# Patient Record
Sex: FEMALE | Race: BLACK OR AFRICAN AMERICAN | NOT HISPANIC OR LATINO | Employment: OTHER | URBAN - METROPOLITAN AREA
[De-identification: names, ages, dates, MRNs, and addresses within clinical notes are randomized per-mention and may not be internally consistent; named-entity substitution may affect disease eponyms.]

---

## 2017-02-09 ENCOUNTER — APPOINTMENT (OUTPATIENT)
Dept: CT IMAGING | Facility: HOSPITAL | Age: 57
End: 2017-02-09

## 2017-02-09 ENCOUNTER — HOSPITAL ENCOUNTER (EMERGENCY)
Facility: HOSPITAL | Age: 57
Discharge: HOME | End: 2017-02-09
Attending: EMERGENCY MEDICINE | Admitting: EMERGENCY MEDICINE

## 2017-02-09 VITALS
RESPIRATION RATE: 16 BRPM | HEART RATE: 70 BPM | SYSTOLIC BLOOD PRESSURE: 140 MMHG | TEMPERATURE: 97.9 F | WEIGHT: 187 LBS | DIASTOLIC BLOOD PRESSURE: 70 MMHG | OXYGEN SATURATION: 96 % | HEIGHT: 67 IN | BODY MASS INDEX: 29.35 KG/M2

## 2017-02-09 DIAGNOSIS — R10.30 LOWER ABDOMINAL PAIN: Primary | ICD-10-CM

## 2017-02-09 DIAGNOSIS — R19.7 DIARRHEA, UNSPECIFIED TYPE: ICD-10-CM

## 2017-02-09 LAB
ALBUMIN SERPL-MCNC: 4.7 G/DL (ref 3.5–5.2)
ALBUMIN/GLOB SERPL: 1.5 G/DL
ALP SERPL-CCNC: 93 U/L (ref 39–117)
ALT SERPL W P-5'-P-CCNC: 16 U/L (ref 1–33)
ANION GAP SERPL CALCULATED.3IONS-SCNC: 10.7 MMOL/L
AST SERPL-CCNC: 20 U/L (ref 1–32)
BASOPHILS # BLD AUTO: 0.02 10*3/MM3 (ref 0–0.2)
BASOPHILS NFR BLD AUTO: 0.3 % (ref 0–1.5)
BILIRUB SERPL-MCNC: 0.4 MG/DL (ref 0.1–1.2)
BILIRUB UR QL STRIP: NEGATIVE
BUN BLD-MCNC: 7 MG/DL (ref 6–20)
BUN/CREAT SERPL: 9.1 (ref 7–25)
CALCIUM SPEC-SCNC: 9.7 MG/DL (ref 8.6–10.5)
CHLORIDE SERPL-SCNC: 104 MMOL/L (ref 98–107)
CLARITY UR: CLEAR
CO2 SERPL-SCNC: 29.3 MMOL/L (ref 22–29)
COLOR UR: YELLOW
CREAT BLD-MCNC: 0.77 MG/DL (ref 0.57–1)
DEPRECATED RDW RBC AUTO: 39.2 FL (ref 37–54)
EOSINOPHIL # BLD AUTO: 0.14 10*3/MM3 (ref 0–0.7)
EOSINOPHIL NFR BLD AUTO: 1.8 % (ref 0.3–6.2)
ERYTHROCYTE [DISTWIDTH] IN BLOOD BY AUTOMATED COUNT: 12.8 % (ref 11.7–13)
GFR SERPL CREATININE-BSD FRML MDRD: 93 ML/MIN/1.73
GLOBULIN UR ELPH-MCNC: 3.1 GM/DL
GLUCOSE BLD-MCNC: 104 MG/DL (ref 65–99)
GLUCOSE UR STRIP-MCNC: NEGATIVE MG/DL
HCT VFR BLD AUTO: 41.8 % (ref 35.6–45.5)
HGB BLD-MCNC: 14.5 G/DL (ref 11.9–15.5)
HGB UR QL STRIP.AUTO: NEGATIVE
HOLD SPECIMEN: NORMAL
HOLD SPECIMEN: NORMAL
IMM GRANULOCYTES # BLD: 0.02 10*3/MM3 (ref 0–0.03)
IMM GRANULOCYTES NFR BLD: 0.3 % (ref 0–0.5)
KETONES UR QL STRIP: NEGATIVE
LEUKOCYTE ESTERASE UR QL STRIP.AUTO: NEGATIVE
LIPASE SERPL-CCNC: 15 U/L (ref 13–60)
LYMPHOCYTES # BLD AUTO: 1.98 10*3/MM3 (ref 0.9–4.8)
LYMPHOCYTES NFR BLD AUTO: 25.3 % (ref 19.6–45.3)
MCH RBC QN AUTO: 29.3 PG (ref 26.9–32)
MCHC RBC AUTO-ENTMCNC: 34.7 G/DL (ref 32.4–36.3)
MCV RBC AUTO: 84.4 FL (ref 80.5–98.2)
MONOCYTES # BLD AUTO: 0.27 10*3/MM3 (ref 0.2–1.2)
MONOCYTES NFR BLD AUTO: 3.5 % (ref 5–12)
NEUTROPHILS # BLD AUTO: 5.39 10*3/MM3 (ref 1.9–8.1)
NEUTROPHILS NFR BLD AUTO: 68.8 % (ref 42.7–76)
NITRITE UR QL STRIP: NEGATIVE
PH UR STRIP.AUTO: <=5 [PH] (ref 5–8)
PLATELET # BLD AUTO: 218 10*3/MM3 (ref 140–500)
PMV BLD AUTO: 10.7 FL (ref 6–12)
POTASSIUM BLD-SCNC: 3.8 MMOL/L (ref 3.5–5.2)
PROT SERPL-MCNC: 7.8 G/DL (ref 6–8.5)
PROT UR QL STRIP: NEGATIVE
RBC # BLD AUTO: 4.95 10*6/MM3 (ref 3.9–5.2)
SODIUM BLD-SCNC: 144 MMOL/L (ref 136–145)
SP GR UR STRIP: 1.01 (ref 1–1.03)
UROBILINOGEN UR QL STRIP: NORMAL
WBC NRBC COR # BLD: 7.82 10*3/MM3 (ref 4.5–10.7)
WHOLE BLOOD HOLD SPECIMEN: NORMAL
WHOLE BLOOD HOLD SPECIMEN: NORMAL

## 2017-02-09 PROCEDURE — 81003 URINALYSIS AUTO W/O SCOPE: CPT | Performed by: EMERGENCY MEDICINE

## 2017-02-09 PROCEDURE — 83690 ASSAY OF LIPASE: CPT | Performed by: EMERGENCY MEDICINE

## 2017-02-09 PROCEDURE — 0 IOPAMIDOL 61 % SOLUTION: Performed by: EMERGENCY MEDICINE

## 2017-02-09 PROCEDURE — 99283 EMERGENCY DEPT VISIT LOW MDM: CPT

## 2017-02-09 PROCEDURE — 74177 CT ABD & PELVIS W/CONTRAST: CPT

## 2017-02-09 PROCEDURE — 80053 COMPREHEN METABOLIC PANEL: CPT | Performed by: EMERGENCY MEDICINE

## 2017-02-09 PROCEDURE — 85025 COMPLETE CBC W/AUTO DIFF WBC: CPT | Performed by: EMERGENCY MEDICINE

## 2017-02-09 RX ORDER — SODIUM CHLORIDE 0.9 % (FLUSH) 0.9 %
10 SYRINGE (ML) INJECTION AS NEEDED
Status: DISCONTINUED | OUTPATIENT
Start: 2017-02-09 | End: 2017-02-09 | Stop reason: HOSPADM

## 2017-02-09 RX ORDER — LATANOPROST 50 UG/ML
1 SOLUTION/ DROPS OPHTHALMIC NIGHTLY
COMMUNITY

## 2017-02-09 RX ORDER — OMEPRAZOLE 20 MG/1
20 CAPSULE, DELAYED RELEASE ORAL DAILY
COMMUNITY
Start: 2017-02-02 | End: 2017-03-04

## 2017-02-09 RX ORDER — TOPIRAMATE 100 MG/1
100 TABLET, FILM COATED ORAL NIGHTLY
COMMUNITY
Start: 2016-08-23 | End: 2017-05-08

## 2017-02-09 RX ORDER — CIPROFLOXACIN 500 MG/1
500 TABLET, FILM COATED ORAL 2 TIMES DAILY
COMMUNITY
End: 2017-05-08

## 2017-02-09 RX ORDER — POLYETHYLENE GLYCOL 3350 17 G/17G
17 POWDER, FOR SOLUTION ORAL DAILY
COMMUNITY

## 2017-02-09 RX ORDER — DICYCLOMINE HYDROCHLORIDE 10 MG/1
10 CAPSULE ORAL 3 TIMES DAILY PRN
Qty: 20 CAPSULE | Refills: 0 | Status: SHIPPED | OUTPATIENT
Start: 2017-02-09 | End: 2017-05-08

## 2017-02-09 RX ADMIN — IOPAMIDOL 85 ML: 612 INJECTION, SOLUTION INTRAVENOUS at 09:55

## 2017-02-09 NOTE — ED PROVIDER NOTES
EMERGENCY DEPARTMENT ENCOUNTER    CHIEF COMPLAINT  Chief Complaint: abd pain  History given by: patient  History limited by: nothing  Room Number: 27/27  PMD: Dyan Diggs MD      HPI:  Pt is a 56 y.o. female who presents complaining of lower abd pain onset 3 weeks ago. Pt also c/o diarrhea but denies black or bloody stools, dysuria, fever, chills, cough. Pt has seen her PCP but states no tests were done and she was sent home w/ Cipro. She also states she discontinued the Cipro because it caused her to have seizures again, but has not told her PCP that she discontinued the Cipro. She has previously seen a GI doctor who told her to take a laxative that she states has caused abd pain as well. Pt has hx of seizures, constipation.    Duration:  3 weeks  Onset: gradual  Timing: constant  Location: lower abd  Radiation: none  Quality: not described  Intensity/Severity: moderate  Progression: unchanged  Associated Symptoms: diarrhea (daily)  Aggravating Factors: none  Alleviating Factors: none  Previous Episodes: none  Treatment before arrival: laxative, Cipro (discontinued by patient)    PAST MEDICAL HISTORY  Active Ambulatory Problems     Diagnosis Date Noted   • No Active Ambulatory Problems     Resolved Ambulatory Problems     Diagnosis Date Noted   • No Resolved Ambulatory Problems     Past Medical History   Diagnosis Date   • Hypertension    • Seizures        PAST SURGICAL HISTORY  Past Surgical History   Procedure Laterality Date   • Hysterectomy         FAMILY HISTORY  Family History   Problem Relation Age of Onset   • Heart disease Sister    • Diabetes Paternal Aunt        SOCIAL HISTORY  Social History     Social History   • Marital status: Single     Spouse name: N/A   • Number of children: N/A   • Years of education: N/A     Occupational History   • Not on file.     Social History Main Topics   • Smoking status: Light Tobacco Smoker     Packs/day: 0.25     Types: Cigarettes   • Smokeless tobacco: Not  on file   • Alcohol use No   • Drug use: No   • Sexual activity: Not on file     Other Topics Concern   • Not on file     Social History Narrative   • No narrative on file       ALLERGIES  Latex and Penicillin g benzathine    REVIEW OF SYSTEMS  Review of Systems   Constitutional: Negative for chills and fever.   HENT: Negative for congestion and sore throat.    Eyes: Negative.    Respiratory: Negative for cough and shortness of breath.    Cardiovascular: Negative for chest pain and leg swelling.   Gastrointestinal: Positive for abdominal pain (lower) and diarrhea (daily). Negative for vomiting.   Genitourinary: Negative for difficulty urinating and dysuria.   Musculoskeletal: Negative for back pain and neck pain.   Skin: Negative for rash and wound.   Allergic/Immunologic: Negative.    Neurological: Negative for dizziness, weakness, numbness and headaches.   Psychiatric/Behavioral: Negative.    All other systems reviewed and are negative.      PHYSICAL EXAM  ED Triage Vitals   Temp Heart Rate Resp BP SpO2   02/09/17 0802 02/09/17 0802 02/09/17 0802 02/09/17 0802 02/09/17 0802   97.9 °F (36.6 °C) 85 16 166/89 98 %      Temp src Heart Rate Source Patient Position BP Location FiO2 (%)   -- 02/09/17 0859 02/09/17 0802 02/09/17 0802 --    Monitor Sitting Right arm        Physical Exam   Constitutional: She is oriented to person, place, and time and well-developed, well-nourished, and in no distress. No distress.   HENT:   Head: Normocephalic and atraumatic.   Eyes: EOM are normal. Pupils are equal, round, and reactive to light.   Neck: Normal range of motion. Neck supple.   Cardiovascular: Normal rate, regular rhythm and normal heart sounds.    Pulmonary/Chest: Effort normal and breath sounds normal. No respiratory distress.   Abdominal: Soft. She exhibits no mass. There is tenderness (LLQ). There is no rebound and no guarding.   Musculoskeletal: Normal range of motion. She exhibits no edema.   Neurological: She is alert  and oriented to person, place, and time. She has normal sensation and normal strength.   Skin: Skin is warm and dry. No rash noted.   Psychiatric: Mood and affect normal.   Nursing note and vitals reviewed.      LAB RESULTS  Lab Results (last 24 hours)     Procedure Component Value Units Date/Time    CBC & Differential [[id number]] Collected:  02/09/17 0843    Specimen:  Blood Updated:  02/09/17 0902    Narrative:       The following orders were created for panel order CBC & Differential.  Procedure                               Abnormality         Status                     ---------                               -----------         ------                     CBC Auto Differential[[id number]]         Abnormal            Final result                 Please view results for these tests on the individual orders.    Comprehensive Metabolic Panel [[id number]]  (Abnormal) Collected:  02/09/17 0843    Specimen:  Blood Updated:  02/09/17 0923     Glucose 104 (H) mg/dL      BUN 7 mg/dL      Creatinine 0.77 mg/dL      Sodium 144 mmol/L      Potassium 3.8 mmol/L      Chloride 104 mmol/L      CO2 29.3 (H) mmol/L      Calcium 9.7 mg/dL      Total Protein 7.8 g/dL      Albumin 4.70 g/dL      ALT (SGPT) 16 U/L      AST (SGOT) 20 U/L      Alkaline Phosphatase 93 U/L      Total Bilirubin 0.4 mg/dL      eGFR  African Amer 93 mL/min/1.73      Globulin 3.1 gm/dL      A/G Ratio 1.5 g/dL      BUN/Creatinine Ratio 9.1      Anion Gap 10.7 mmol/L     Lipase [[id number]]  (Normal) Collected:  02/09/17 0843    Specimen:  Blood Updated:  02/09/17 0923     Lipase 15 U/L     CBC Auto Differential [[id number]]  (Abnormal) Collected:  02/09/17 0843    Specimen:  Blood Updated:  02/09/17 0902     WBC 7.82 10*3/mm3      RBC 4.95 10*6/mm3      Hemoglobin 14.5 g/dL      Hematocrit 41.8 %      MCV 84.4 fL      MCH 29.3 pg      MCHC 34.7 g/dL      RDW 12.8 %      RDW-SD 39.2 fl      MPV 10.7 fL      Platelets 218 10*3/mm3      Neutrophil % 68.8 %      Lymphocyte  % 25.3 %      Monocyte % 3.5 (L) %      Eosinophil % 1.8 %      Basophil % 0.3 %      Immature Grans % 0.3 %      Neutrophils, Absolute 5.39 10*3/mm3      Lymphocytes, Absolute 1.98 10*3/mm3      Monocytes, Absolute 0.27 10*3/mm3      Eosinophils, Absolute 0.14 10*3/mm3      Basophils, Absolute 0.02 10*3/mm3      Immature Grans, Absolute 0.02 10*3/mm3     Urinalysis With / Culture If Indicated [[id number]]  (Normal) Collected:  02/09/17 0846    Specimen:  Urine from Urine, Clean Catch Updated:  02/09/17 0903     Color, UA Yellow      Appearance, UA Clear      pH, UA <=5.0      Specific Gravity, UA 1.008      Glucose, UA Negative      Ketones, UA Negative      Bilirubin, UA Negative      Blood, UA Negative      Protein, UA Negative      Leuk Esterase, UA Negative      Nitrite, UA Negative      Urobilinogen, UA 0.2 E.U./dL     Narrative:       Urine microscopic not indicated.        I ordered the above labs and reviewed the results    RADIOLOGY  CT Abdomen Pelvis With Contrast    (Results Pending)     I ordered the above noted radiological studies. Interpreted by radiologist. Discussed with radiologist (Dr. Velasco). Reviewed by me in PACS.       PROCEDURES  Procedures      PROGRESS AND CONSULTS  ED Course     0937  Ordered CT abd/pel for further evaluation. Placed call to PCP for consult.    0943  Call returned from Dr. Diggs, PCP, who states pt has c/o these sx for 3 years. Per PCP pt's GI doctor is Dr. Vinicio Lucero who pt last saw in March or April 2016.    1048  Rechecked pt who is resting comfortably. D/w pt normal CT abd/pel and unremarkable lab work and plan to d/c w/ f/u w/ Dr. Lucero. Gave RTER warnings. Pt understands and agrees with the plan. All questions answered.    MEDICAL DECISION MAKING  Results were reviewed/discussed with the patient and they were also made aware of online access. Pt also made aware that some labs, such as cultures, will not be resulted during ER visit and follow up with PMD is necessary.      MDM  Number of Diagnoses or Management Options     Amount and/or Complexity of Data Reviewed  Clinical lab tests: reviewed and ordered (Lab work is unremarkable.)  Tests in the radiology section of CPT®: ordered and reviewed (CT abd/pel shows nothing acute.)  Decide to obtain previous medical records or to obtain history from someone other than the patient: yes  Review and summarize past medical records: yes  Discuss the patient with other providers: yes (Dr. Diggs, PCP, Dr. Velasco, radiology)  Independent visualization of images, tracings, or specimens: yes    Patient Progress  Patient progress: stable         DIAGNOSIS  Final diagnoses:   Lower abdominal pain   Diarrhea, unspecified type       DISPOSITION  DISCHARGE    Patient discharged in stable condition.    Reviewed implications of results, diagnosis, meds, responsibility to follow up, warning signs and symptoms of possible worsening, potential complications and reasons to return to ER, including worsening abd pain.    Patient/Family voiced understanding of above instructions.    Discussed plan for discharge, as there is no emergent indication for admission.  Pt/family is agreeable and understands need for follow up and repeat testing.  Pt is aware that discharge does not mean that nothing is wrong but it indicates no emergency is present that requires admission and they must continue care with follow-up as given below or physician of their choice.     FOLLOW-UP  Vinicio Lucero MD  [address]  [place] [address]  [phone]    Call in 1 day  For follow-up    Dyan Diggs MD  [address] [facility]  [address]  [place] [address]  [phone]    Schedule an appointment as soon as possible for a visit         Medication List      New Prescriptions          dicyclomine 10 MG capsule   Commonly known as:  BENTYL   Take 1 capsule by mouth 3 (Three) Times a Day As Needed (pain and   cramping).         Changed           topiramate 100 MG tablet   Commonly known as:  TOPAMAX   What changed:  Another medication with the same name was removed. Continue   taking this medication, and follow the directions you see here.         Latest Documented Vital Signs:  As of 11:24 AM  BP- 140/70 HR- 70 Temp- 97.9 °F (36.6 °C) O2 sat- 96%    --  Documentation assistance provided by lora Minaya for Dr. Patel.  Information recorded by the scribe was done at my direction and has been verified and validated by me.     Brandt Minaya  02/09/17 1058       Eddy Patel MD  02/09/17 1167

## 2017-02-09 NOTE — ED NOTES
"Pt c/o lower abdomen pain for three weeks. Has been seen by her PCP but \"They can't figure out what is wrong\". Pt reports pain is sharp and she also has diarrhea. + nausea.     Mary Arevalo RN  02/09/17 0802    "

## 2017-03-20 RX ORDER — TEMAZEPAM 15 MG/1
CAPSULE ORAL
Qty: 30 CAPSULE | Refills: 2 | Status: SHIPPED | OUTPATIENT
Start: 2017-03-20 | End: 2017-06-19 | Stop reason: SDUPTHER

## 2017-04-28 ENCOUNTER — TELEPHONE (OUTPATIENT)
Dept: NEUROLOGY | Facility: CLINIC | Age: 57
End: 2017-04-28

## 2017-05-08 ENCOUNTER — OFFICE VISIT (OUTPATIENT)
Dept: NEUROLOGY | Facility: CLINIC | Age: 57
End: 2017-05-08

## 2017-05-08 VITALS
WEIGHT: 194 LBS | DIASTOLIC BLOOD PRESSURE: 84 MMHG | BODY MASS INDEX: 30.45 KG/M2 | SYSTOLIC BLOOD PRESSURE: 138 MMHG | HEIGHT: 67 IN

## 2017-05-08 DIAGNOSIS — G40.209 CONVULSION, PSYCHOMOTOR (HCC): ICD-10-CM

## 2017-05-08 DIAGNOSIS — G47.00 INSOMNIA, UNSPECIFIED TYPE: ICD-10-CM

## 2017-05-08 DIAGNOSIS — G43.109 MIGRAINE WITH AURA AND WITHOUT STATUS MIGRAINOSUS, NOT INTRACTABLE: Primary | ICD-10-CM

## 2017-05-08 PROBLEM — F48.2: Status: ACTIVE | Noted: 2017-05-08

## 2017-05-08 PROCEDURE — 99213 OFFICE O/P EST LOW 20 MIN: CPT | Performed by: NURSE PRACTITIONER

## 2017-05-08 RX ORDER — FLUTICASONE PROPIONATE 50 MCG
SPRAY, SUSPENSION (ML) NASAL
COMMUNITY
Start: 2017-05-02 | End: 2018-01-30

## 2017-05-08 RX ORDER — LOSARTAN POTASSIUM 50 MG/1
TABLET ORAL
COMMUNITY
Start: 2017-03-10

## 2017-05-08 RX ORDER — LEVETIRACETAM 500 MG/1
TABLET ORAL
Qty: 360 TABLET | Refills: 3 | Status: SHIPPED | OUTPATIENT
Start: 2017-05-08 | End: 2018-01-30 | Stop reason: SDUPTHER

## 2017-06-20 RX ORDER — TEMAZEPAM 15 MG/1
CAPSULE ORAL
Qty: 30 CAPSULE | Refills: 1 | Status: SHIPPED | OUTPATIENT
Start: 2017-06-20 | End: 2017-08-21 | Stop reason: SDUPTHER

## 2017-08-21 RX ORDER — TEMAZEPAM 15 MG/1
CAPSULE ORAL
Qty: 30 CAPSULE | Refills: 1 | Status: SHIPPED | OUTPATIENT
Start: 2017-08-21 | End: 2017-12-21 | Stop reason: SDUPTHER

## 2017-12-22 RX ORDER — TEMAZEPAM 15 MG/1
CAPSULE ORAL
Qty: 30 CAPSULE | Refills: 2 | Status: SHIPPED | OUTPATIENT
Start: 2017-12-22 | End: 2018-04-21 | Stop reason: SDUPTHER

## 2018-01-30 ENCOUNTER — OFFICE VISIT (OUTPATIENT)
Dept: NEUROLOGY | Facility: CLINIC | Age: 58
End: 2018-01-30

## 2018-01-30 VITALS
BODY MASS INDEX: 31.5 KG/M2 | SYSTOLIC BLOOD PRESSURE: 152 MMHG | HEIGHT: 66 IN | DIASTOLIC BLOOD PRESSURE: 90 MMHG | WEIGHT: 196 LBS

## 2018-01-30 DIAGNOSIS — G40.209 CONVULSION, PSYCHOMOTOR (HCC): Primary | ICD-10-CM

## 2018-01-30 DIAGNOSIS — G47.00 INSOMNIA, UNSPECIFIED TYPE: ICD-10-CM

## 2018-01-30 DIAGNOSIS — G43.109 MIGRAINE WITH AURA AND WITHOUT STATUS MIGRAINOSUS, NOT INTRACTABLE: ICD-10-CM

## 2018-01-30 PROCEDURE — 99213 OFFICE O/P EST LOW 20 MIN: CPT | Performed by: NURSE PRACTITIONER

## 2018-01-30 RX ORDER — LEVETIRACETAM 500 MG/1
TABLET ORAL
Qty: 360 TABLET | Refills: 3 | Status: SHIPPED | OUTPATIENT
Start: 2018-01-30

## 2018-01-30 RX ORDER — ALBUTEROL SULFATE 90 UG/1
2 AEROSOL, METERED RESPIRATORY (INHALATION)
COMMUNITY
Start: 2017-07-27

## 2018-01-30 NOTE — PROGRESS NOTES
Subjective:     Patient ID: Fariha Aragon is a 57 y.o. female presenting for follow up for epilepsy starting at age 7 occurring several times per month. She took Dilantin, Phenobarbital, and Tegretol for years. She is now taking Keppra 500 mg 2 tablets twice daily.     Her seizures are described as having an aura and then within a few seconds has a convulsion. During some seizures she may wander around aimlessly, pushing elevator buttons, doing nonsensical things.     She was told in the past that an EEG showed seizure activity coming from the left side.     She had remained seizure free for many years on Keppra until June 2017 she had a seizure. She was very stressed out. Her mother recently passed away and she also was scammed, losing ten thousand dollars. She has not had a seizure since that time. She is driving.     Stress with headaches: Her migraines have been well controlled in the past on topiramate 2 tablets 50 mg up to 4 tablets nightly or 2 tablets twice daily. She tolerates the medication well without side effects.    Insomnia: She takes temazepam for this, 15 mg once nightly.     Migraine    Associated symptoms include abdominal pain, photophobia and seizures. Pertinent negatives include no back pain, dizziness, eye pain, hearing loss, nausea, neck pain, numbness, vomiting or weakness.     The following portions of the patient's history were reviewed and updated as appropriate: allergies, current medications, past family history, past medical history, past social history, past surgical history and problem list.    Review of Systems   Constitutional: Negative for activity change, appetite change and fatigue.   HENT: Negative for facial swelling, hearing loss and trouble swallowing.    Eyes: Positive for photophobia and visual disturbance. Negative for pain.   Respiratory: Negative for choking, chest tightness and shortness of breath.    Cardiovascular: Negative for chest pain and leg swelling.    Gastrointestinal: Positive for abdominal pain. Negative for nausea and vomiting.   Endocrine: Positive for polydipsia. Negative for polyphagia.   Genitourinary: Negative for difficulty urinating, frequency and urgency.   Musculoskeletal: Negative for back pain, gait problem and neck pain.   Allergic/Immunologic: Positive for environmental allergies. Negative for food allergies and immunocompromised state.   Neurological: Positive for seizures and headaches. Negative for dizziness, tremors, syncope, facial asymmetry, speech difficulty, weakness, light-headedness and numbness.   Hematological: Negative for adenopathy. Bruises/bleeds easily.   Psychiatric/Behavioral: Positive for confusion, decreased concentration and sleep disturbance. Negative for agitation, behavioral problems, dysphoric mood, hallucinations, self-injury and suicidal ideas. The patient is nervous/anxious and is hyperactive.         Objective:    Neurologic Exam  General: Well nourished, well developed, and in no acute distress.  HEENT: Normocephalic/atraumatic. Mucous membranes moist. Sclerae anicteric.   Heart: Regular rate and rhythm. No murmurs, rubs or gallops.  Lungs: Clear to auscultation bilaterally.  Skin: No notable rashes or lesions on the visible surfaces.   Extremities: No clubbing, cyanosis or significant edema.   Psychiatric: Pleasant, cooperative, and appropriate.   Neurologic Exam:  Mental Status:  Alert and oriented. Speech is fluent. Comprehension is intact.   Cranial Nerves II-XII: Pupils equal, round, reactive to light. Extraocular movements are full and conjugate in all directions. No nystagmus noted.  Hearing is intact to voice. Facial strength and sensation are preserved and symmetric. Tongue and palate midline. Voice non-hoarse, non-dysarthric.   Motor: Normal bulk and tone of bilateral upper and lower extremities. Strength is 5/5 in all 4 extremities both proximally and distally. There are no abnormal or involuntary  movements noted.  Sensation: Intact to light touch throughout. Romberg was negative with no significant sway.   Coordination: Fully intact. Finger-to-nose performed accurately bilaterally.  Reflexes: The deep tendon reflexes are 2+/4 in bilateral biceps, brachioradialis, triceps, patella, and Achilles.    Gait: Normal.   Physical Exam    Assessment/Plan:     Fariha was seen today for migraine.    Diagnoses and all orders for this visit:    Convulsion, psychomotor    Migraine with aura and without status migrainosus, not intractable    Insomnia, unspecified type    Other orders  -     levETIRAcetam (KEPPRA) 500 MG tablet; Take 2 tabs bid       She is to continue Keppra 500 mg two tablets twice daily.     She is to continue topiramate 100 mg twice a day for migraine.     She will continue temazepam 15 mg as needed at night for insomnia.     Follow up annual.    Driving not restricted because she has been seizure free for more than 90 days.     Seizure precautions discussed. She is to notify our office should she have a seizure and is not to drive until cleared to do so.     ------    I reviewed the notes and examination findings and agree with the plan.  No driving restrictions.  CAREN licea

## 2018-04-23 RX ORDER — TEMAZEPAM 15 MG/1
CAPSULE ORAL
Qty: 30 CAPSULE | Refills: 2 | Status: SHIPPED | OUTPATIENT
Start: 2018-04-23

## 2018-08-23 RX ORDER — TEMAZEPAM 15 MG/1
CAPSULE ORAL
Qty: 30 CAPSULE | Refills: 0 | OUTPATIENT
Start: 2018-08-23

## 2018-08-23 NOTE — TELEPHONE ENCOUNTER
It has been a long time since she has been here so probably she needs to get this from her PCP now

## 2021-03-22 ENCOUNTER — BULK ORDERING (OUTPATIENT)
Dept: CASE MANAGEMENT | Facility: OTHER | Age: 61
End: 2021-03-22

## 2021-03-22 DIAGNOSIS — Z23 IMMUNIZATION DUE: ICD-10-CM
